# Patient Record
Sex: MALE | Race: BLACK OR AFRICAN AMERICAN | NOT HISPANIC OR LATINO | Employment: UNEMPLOYED | URBAN - METROPOLITAN AREA
[De-identification: names, ages, dates, MRNs, and addresses within clinical notes are randomized per-mention and may not be internally consistent; named-entity substitution may affect disease eponyms.]

---

## 2021-06-25 ENCOUNTER — APPOINTMENT (EMERGENCY)
Dept: CT IMAGING | Facility: HOSPITAL | Age: 51
End: 2021-06-25
Payer: COMMERCIAL

## 2021-06-25 ENCOUNTER — HOSPITAL ENCOUNTER (EMERGENCY)
Facility: HOSPITAL | Age: 51
Discharge: HOME | End: 2021-06-25
Attending: EMERGENCY MEDICINE | Admitting: EMERGENCY MEDICINE
Payer: COMMERCIAL

## 2021-06-25 VITALS
DIASTOLIC BLOOD PRESSURE: 75 MMHG | SYSTOLIC BLOOD PRESSURE: 144 MMHG | RESPIRATION RATE: 12 BRPM | HEART RATE: 77 BPM | HEIGHT: 67 IN | WEIGHT: 146.6 LBS | TEMPERATURE: 99.8 F | BODY MASS INDEX: 23.01 KG/M2 | OXYGEN SATURATION: 100 %

## 2021-06-25 DIAGNOSIS — J01.00 ACUTE ABSCESS OF MAXILLARY SINUS: Primary | ICD-10-CM

## 2021-06-25 LAB
ANION GAP SERPL CALCULATED.3IONS-SCNC: 9 MMOL/L (ref 4–13)
BASOPHILS # BLD AUTO: 0.03 THOUSANDS/ΜL (ref 0–0.1)
BASOPHILS NFR BLD AUTO: 0 % (ref 0–1)
BUN SERPL-MCNC: 6 MG/DL (ref 6–20)
CALCIUM SERPL-MCNC: 9.6 MG/DL (ref 8.4–10.2)
CHLORIDE SERPL-SCNC: 99 MMOL/L (ref 96–108)
CO2 SERPL-SCNC: 27 MMOL/L (ref 22–33)
CREAT SERPL-MCNC: 0.71 MG/DL (ref 0.5–1.2)
EOSINOPHIL # BLD AUTO: 0.05 THOUSAND/ΜL (ref 0–0.61)
EOSINOPHIL NFR BLD AUTO: 0 % (ref 0–6)
ERYTHROCYTE [DISTWIDTH] IN BLOOD BY AUTOMATED COUNT: 14 % (ref 11.6–15.1)
GFR SERPL CREATININE-BSD FRML MDRD: 128 ML/MIN/1.73SQ M
GLUCOSE SERPL-MCNC: 105 MG/DL (ref 65–140)
HCT VFR BLD AUTO: 44.1 % (ref 36.5–49.3)
HGB BLD-MCNC: 15 G/DL (ref 12–17)
IMM GRANULOCYTES # BLD AUTO: 0.03 THOUSAND/UL (ref 0–0.2)
IMM GRANULOCYTES NFR BLD AUTO: 0 % (ref 0–2)
LACTATE SERPL-SCNC: 1.1 MMOL/L (ref 0–2)
LYMPHOCYTES # BLD AUTO: 2.42 THOUSANDS/ΜL (ref 0.6–4.47)
LYMPHOCYTES NFR BLD AUTO: 18 % (ref 14–44)
MCH RBC QN AUTO: 30 PG (ref 26.8–34.3)
MCHC RBC AUTO-ENTMCNC: 34 G/DL (ref 31.4–37.4)
MCV RBC AUTO: 88 FL (ref 82–98)
MONOCYTES # BLD AUTO: 1.69 THOUSAND/ΜL (ref 0.17–1.22)
MONOCYTES NFR BLD AUTO: 13 % (ref 4–12)
NEUTROPHILS # BLD AUTO: 9.27 THOUSANDS/ΜL (ref 1.85–7.62)
NEUTS SEG NFR BLD AUTO: 69 % (ref 43–75)
PLATELET # BLD AUTO: 240 THOUSANDS/UL (ref 149–390)
PMV BLD AUTO: 9.6 FL (ref 8.9–12.7)
POTASSIUM SERPL-SCNC: 3.6 MMOL/L (ref 3.5–5)
RBC # BLD AUTO: 5 MILLION/UL (ref 3.88–5.62)
SODIUM SERPL-SCNC: 135 MMOL/L (ref 133–145)
WBC # BLD AUTO: 13.49 THOUSAND/UL (ref 4.31–10.16)

## 2021-06-25 PROCEDURE — 99284 EMERGENCY DEPT VISIT MOD MDM: CPT

## 2021-06-25 PROCEDURE — 99284 EMERGENCY DEPT VISIT MOD MDM: CPT | Performed by: PHYSICIAN ASSISTANT

## 2021-06-25 PROCEDURE — 36415 COLL VENOUS BLD VENIPUNCTURE: CPT | Performed by: PHYSICIAN ASSISTANT

## 2021-06-25 PROCEDURE — 96366 THER/PROPH/DIAG IV INF ADDON: CPT

## 2021-06-25 PROCEDURE — 87040 BLOOD CULTURE FOR BACTERIA: CPT | Performed by: PHYSICIAN ASSISTANT

## 2021-06-25 PROCEDURE — 85025 COMPLETE CBC W/AUTO DIFF WBC: CPT | Performed by: PHYSICIAN ASSISTANT

## 2021-06-25 PROCEDURE — 96375 TX/PRO/DX INJ NEW DRUG ADDON: CPT

## 2021-06-25 PROCEDURE — G1004 CDSM NDSC: HCPCS

## 2021-06-25 PROCEDURE — 80048 BASIC METABOLIC PNL TOTAL CA: CPT | Performed by: PHYSICIAN ASSISTANT

## 2021-06-25 PROCEDURE — 70487 CT MAXILLOFACIAL W/DYE: CPT

## 2021-06-25 PROCEDURE — 96365 THER/PROPH/DIAG IV INF INIT: CPT

## 2021-06-25 PROCEDURE — 83605 ASSAY OF LACTIC ACID: CPT | Performed by: PHYSICIAN ASSISTANT

## 2021-06-25 RX ORDER — CLINDAMYCIN HYDROCHLORIDE 150 MG/1
CAPSULE ORAL
Qty: 63 CAPSULE | Refills: 0 | Status: SHIPPED | OUTPATIENT
Start: 2021-06-25 | End: 2021-07-02

## 2021-06-25 RX ORDER — KETOROLAC TROMETHAMINE 30 MG/ML
15 INJECTION, SOLUTION INTRAMUSCULAR; INTRAVENOUS ONCE
Status: COMPLETED | OUTPATIENT
Start: 2021-06-25 | End: 2021-06-25

## 2021-06-25 RX ORDER — TRAMADOL HYDROCHLORIDE 50 MG/1
50 TABLET ORAL EVERY 6 HOURS PRN
Qty: 10 TABLET | Refills: 0 | Status: SHIPPED | OUTPATIENT
Start: 2021-06-25 | End: 2021-07-05

## 2021-06-25 RX ORDER — CLINDAMYCIN PHOSPHATE 600 MG/50ML
600 INJECTION INTRAVENOUS ONCE
Status: COMPLETED | OUTPATIENT
Start: 2021-06-25 | End: 2021-06-25

## 2021-06-25 RX ADMIN — CLINDAMYCIN IN 5 PERCENT DEXTROSE 600 MG: 12 INJECTION, SOLUTION INTRAVENOUS at 16:13

## 2021-06-25 RX ADMIN — SODIUM CHLORIDE 1000 ML: 0.9 INJECTION, SOLUTION INTRAVENOUS at 16:04

## 2021-06-25 RX ADMIN — IOHEXOL 100 ML: 350 INJECTION, SOLUTION INTRAVENOUS at 16:46

## 2021-06-25 RX ADMIN — KETOROLAC TROMETHAMINE 15 MG: 30 INJECTION, SOLUTION INTRAMUSCULAR at 16:12

## 2021-06-25 NOTE — ED PROVIDER NOTES
History  Chief Complaint   Patient presents with    Facial Swelling     R upper facial swelling since yesterday, believes originates from a tooth, denies fevers, denies drainage     Charlie Villalta is a 52 y o  male w PMH none significant who presents for evaluation of facial swelling  Patient complains of pain in the R side of the face that began yesterday  Radiates from his tooth of the R upper jaw  Reports poor dentition and does actually have a plan with his dentist for his tooth to be removed however developed acute pain yesterday and feels the pain and swelling has worsened since  Denies f/c  No trouble swallowing or talking  None       History reviewed  No pertinent past medical history  History reviewed  No pertinent surgical history  History reviewed  No pertinent family history  I have reviewed and agree with the history as documented  E-Cigarette/Vaping    E-Cigarette Use Never User      E-Cigarette/Vaping Substances     Social History     Tobacco Use    Smoking status: Current Every Day Smoker     Packs/day: 0 25     Types: Cigarettes    Smokeless tobacco: Never Used   Vaping Use    Vaping Use: Never used   Substance Use Topics    Alcohol use: Yes     Alcohol/week: 2 0 standard drinks     Types: 2 Standard drinks or equivalent per week    Drug use: Not Currently       Review of Systems   Constitutional: Negative for activity change, chills, diaphoresis, fatigue and fever  HENT: Positive for facial swelling  Negative for congestion and rhinorrhea  Eyes: Negative for pain  Respiratory: Negative for cough, chest tightness, shortness of breath and wheezing  Cardiovascular: Negative for chest pain and palpitations  Gastrointestinal: Negative for abdominal distention, constipation, diarrhea, nausea and vomiting  Genitourinary: Negative for difficulty urinating and dysuria  Musculoskeletal: Negative for arthralgias and myalgias     Neurological: Negative for dizziness, weakness, light-headedness and headaches  Psychiatric/Behavioral: The patient is not nervous/anxious  Physical Exam  Physical Exam  Vitals and nursing note reviewed  Constitutional:       General: He is not in acute distress  Appearance: He is well-developed  He is not diaphoretic  HENT:      Head: Normocephalic and atraumatic  Comments: R side of the face and cheek is swollen compared to the L  Slightly tender to palpation just above the R side of the upper lip and slightly firm w induration  No erythema or skin color changes  No warmth of the skin  No trismus  Has diffuse dental decay w several teeth missing  Affected tooth appears to be the R upper premolar  The gingiva around the tooth is swollen and tender  He has caked on asa which he cracked in half and dissolved on the gingiva  Eyes:      Pupils: Pupils are equal, round, and reactive to light  Neck:      Trachea: No tracheal deviation  Cardiovascular:      Rate and Rhythm: Normal rate and regular rhythm  Heart sounds: No murmur heard  No friction rub  No gallop  Pulmonary:      Effort: Pulmonary effort is normal  No respiratory distress  Breath sounds: Normal breath sounds  No wheezing or rales  Abdominal:      General: Bowel sounds are normal  There is no distension  Palpations: Abdomen is soft  There is no mass  Tenderness: There is no abdominal tenderness  There is no guarding  Musculoskeletal:         General: No deformity  Cervical back: Neck supple  Skin:     General: Skin is warm and dry  Neurological:      Mental Status: He is alert and oriented to person, place, and time     Psychiatric:         Behavior: Behavior normal          Vital Signs  ED Triage Vitals [06/25/21 1546]   Temperature Pulse Respirations Blood Pressure SpO2   99 8 °F (37 7 °C) 87 18 154/98 99 %      Temp Source Heart Rate Source Patient Position - Orthostatic VS BP Location FiO2 (%)   Oral Monitor Sitting Left arm --      Pain Score       Worst Possible Pain           Vitals:    06/25/21 1546 06/25/21 1827   BP: 154/98 144/75   Pulse: 87 77   Patient Position - Orthostatic VS: Sitting Lying         Visual Acuity      ED Medications  Medications   sodium chloride 0 9 % bolus 1,000 mL (0 mL Intravenous Stopped 6/25/21 1747)   clindamycin (CLEOCIN) IVPB (premix in dextrose) 600 mg 50 mL (0 mg Intravenous Stopped 6/25/21 1747)   ketorolac (TORADOL) injection 15 mg (15 mg Intravenous Given 6/25/21 1612)   iohexol (OMNIPAQUE) 350 MG/ML injection (SINGLE-DOSE) 100 mL (100 mL Intravenous Given 6/25/21 1646)       Diagnostic Studies  Results Reviewed     Procedure Component Value Units Date/Time    Blood culture #1 [[id number]] Collected: 06/25/21 1606    Lab Status: Preliminary result Specimen: Blood from Arm, Right Updated: 06/29/21 0001     Blood Culture No Growth at 72 hrs  Blood culture #2 [[id number]] Collected: 06/25/21 1608    Lab Status: Preliminary result Specimen: Blood from Arm, Left Updated: 06/29/21 0001     Blood Culture No Growth at 72 hrs      Basic metabolic panel [[id number]] Collected: 06/25/21 1606    Lab Status: Final result Specimen: Blood from Arm, Right Updated: 06/25/21 1633     Sodium 135 mmol/L      Potassium 3 6 mmol/L      Chloride 99 mmol/L      CO2 27 mmol/L      ANION GAP 9 mmol/L      BUN 6 mg/dL      Creatinine 0 71 mg/dL      Glucose 105 mg/dL      Calcium 9 6 mg/dL      eGFR 128 ml/min/1 73sq m     Narrative:      [facility]co guidelines for Chronic Kidney Disease (CKD):     Stage 1 with normal or high GFR (GFR > 90 mL/min/1 73 square meters)    Stage 2 Mild CKD (GFR = 60-89 mL/min/1 73 square meters)    Stage 3A Moderate CKD (GFR = 45-59 mL/min/1 73 square meters)    Stage 3B Moderate CKD (GFR = 30-44 mL/min/1 73 square meters)    Stage 4 Severe CKD (GFR = 15-29 mL/min/1 73 square meters)    Stage 5 End Stage CKD (GFR <15 mL/min/1 73 square meters)  Note: GFR calculation is accurate only with a steady state creatinine    Lactic acid, plasma [[id number]]  (Normal) Collected: 06/25/21 1606    Lab Status: Final result Specimen: Blood from Arm, Right Updated: 06/25/21 1632     LACTIC ACID 1 1 mmol/L     Narrative:      Result may be elevated if tourniquet was used during collection  CBC and differential [[id number]]  (Abnormal) Collected: 06/25/21 1606    Lab Status: Final result Specimen: Blood from Arm, Right Updated: 06/25/21 1619     WBC 13 49 Thousand/uL      RBC 5 00 Million/uL      Hemoglobin 15 0 g/dL      Hematocrit 44 1 %      MCV 88 fL      MCH 30 0 pg      MCHC 34 0 g/dL      RDW 14 0 %      MPV 9 6 fL      Platelets 989 Thousands/uL      Neutrophils Relative 69 %      Immat GRANS % 0 %      Lymphocytes Relative 18 %      Monocytes Relative 13 %      Eosinophils Relative 0 %      Basophils Relative 0 %      Neutrophils Absolute 9 27 Thousands/µL      Immature Grans Absolute 0 03 Thousand/uL      Lymphocytes Absolute 2 42 Thousands/µL      Monocytes Absolute 1 69 Thousand/µL      Eosinophils Absolute 0 05 Thousand/µL      Basophils Absolute 0 03 Thousands/µL                  CT facial bones with contrast   Final Result by Heather Poe DO (06/25 1727)      Odontogenic abscess along the anterior aspect of the right maxilla just to the right of midline measuring 13 x 15 x 6 mm  There is poor dentition throughout with multiple dental caries, fractured teeth, and periapical dental lucencies elsewhere  Reactive adenopathy in the right submandibular region  Extensive facial soft tissue swelling predominantly in the midline somewhat eccentric to the right in the infranasal regions compatible with facial cellulitis           Workstation performed: [id number]                    Procedures  Procedures         ED Course                                           MDM  Number of Diagnoses or Management Options  Acute abscess of maxillary sinus  Diagnosis management comments: DDX includes but not ltd to:   Concern for dental and facial abscess  At risk of developing sirs / sepsis although at present time has stable vitals  No trismus  Clinically protecting airway  Not consistent w ludwigs angina  Plan is to obtain:  CBC to check for anemia, leukocytosis, hydration status  Chemistry panel to check for lyte abnormalities, organ function   Lactic acid as a marker of end organ dysfunction  Blood culture x 2 with concern for infection, risk of bacteremia   CT facial bones / face to evaluate for abscess     Based on results:  CT w maxillary facial abscess  Discussed w [facility] attending, Dr Leyla Maravilla who advised the patient be discharged to home given his hemodynamic stability  He will be dc on clindamycin 450 TID  He can call the office Monday for an appointment with [facility]  I discussed ED return parameters specifically if he develops trouble speaking, opening his mouth, breathing or significantly worsened edema or fevers  Return parameters discussed  Pt requires f/u as an outpt  Pt expresses understanding w above treatment plan  All questions answered prior to d/c  Portions of the record may have been created with voice recognition software   Occasional wrong word or "sound a like" substitutions may have occurred due to the inherent limitations of voice recognition software   Read the chart carefully and recognize, using context, where substitutions have occurred  Disposition  Final diagnoses:   Acute abscess of maxillary sinus     Time reflects when diagnosis was documented in both MDM as applicable and the Disposition within this note     Time User Action Codes Description Comment    6/25/2021  6:50 PM Tamara Siegel Add [J01 [id number]] Acute abscess of maxillary sinus       ED Disposition     ED Disposition Condition Date/Time Comment    Discharge Stable Fri Jun 25, 2021  6:50 PM Andrew Mejia discharge to home/self care              Follow-up Information     Follow up With Specialties Details Why [address] [facility] Faheem Selby  Call in 1 day  [address]  [phone]          Discharge Medication List as of 6/25/2021  7:00 PM      START taking these medications    Details   clindamycin (CLEOCIN) 150 mg capsule Take 3 tablets TID x 7 days  , Print      traMADol (ULTRAM) 50 mg tablet Take 1 tablet (50 mg total) by mouth every 6 (six) hours as needed for moderate pain for up to 10 days, Starting Fri 6/25/2021, Until Mon 7/5/2021 at 2359, Normal           No discharge procedures on file      PDMP Review     None          ED Provider  Electronically Signed by           Norberto Nascimento PA-C  06/29/21 4803

## 2021-07-01 LAB
BACTERIA BLD CULT: NORMAL
BACTERIA BLD CULT: NORMAL